# Patient Record
(demographics unavailable — no encounter records)

---

## 2025-01-22 NOTE — PLAN
[FreeTextEntry1] :  - Continue vyvanse as prescribed by psychiatry - Recommend urgent appointment with Psychiatry  - Follow up 3 months

## 2025-01-22 NOTE — DATA REVIEWED
[FreeTextEntry1] : MSLT: shows a mean sleep latency of 1.1 minutes with 4 SOREMPs episodes, consistent with Narcolepsy\par  PSG: Normal sleep architecture with SOREM\par  Mild obstructive sleep apnea (AHI 2.0/hr), worse in REM sleep ( REM AHI 5.1/hr). The rosemary desaturation dropped as low as 92%\par  Normal baseline oxygenation\par  Normal CO2\par  Normal EKG\par  Mild periodic leg movements of sleep

## 2025-01-22 NOTE — REASON FOR VISIT
[Follow-Up Evaluation] : a follow-up evaluation for [Narcolepsy] : narcolepsy [Patient] : patient [Mother] : mother

## 2025-01-22 NOTE — ASSESSMENT
[FreeTextEntry1] : 15 yo with narcolepsy here for follow up. Did not tolerate Xywav due to significant SE profile. Continues difficulties with sleep maintenance/ fragmented sleep and EDS with vyvanse, though poor sleep hygiene and concerns for significant depression.

## 2025-01-22 NOTE — HISTORY OF PRESENT ILLNESS
[FreeTextEntry1] : Afsaneh is a 15 y/o F with anxiety and depression presenting for follow up evaluation of Narcolepsy.  Recommendations at last visit: - Continue baclofen 20mg QHS with melatonin 2-3 mg - Continue vyvanse 30mg AM, 20mg in the afternoon - Follow up 3 months  Interval hx: Has not been going to school this school year. Mother says there is school refusal. Speaking with psychologist. Naps during the day, doesnt leave the house. Then she is up all night long. Mother stopped giving her baclofen. Continues to take vyvanse 20mg ? TID though she says it is not effective in promoting wakefulness. Stopped taking baclofen.  Current medications: vyvanse 20mg TID ?  Failed medication: Xyrem - negative SE profile ---------------------------------------------------------------------------------------------------- Reviewed hx: Afsaneh presents with her mother for a transfer of care for Narcolepsy previously diagnosed in [place] when patient was 9 years old. SHe was prescribed Vyvanse 20 mg daily with some relief but it did not last long enough. She has never taken another medication for Narcolepsy. Previously has a sleep study in [place]. Afsaneh sees a psychologist and was prescribed Bupropion 75 mg, escitalopram 5 mg by Dr. Angela Mcclelland for anxiety and depression. Wellbutrin was started two weeks ago because she "needed more energy" and plan to wean off the Lexapro. [facility] notes Afsaneh gained a lot of weight with Lexapro. Wellbutrin is making is "making her sleepy." Started seeing this psychiatrist September of last year. Has not been seen by neurology since Feb 2022.  Sleep Environment:  Bedtime:10 pm, falls asleep at 11 pm, Wakes up at 1 am and 4 am sleeps until 5am.  Wake time: 5 am  Weekend bed time 12 midnight  Weekend wake up time: 6 am  Sleep latency: 1 hour  Nighttime awakenings: 2 x a night occasionally  Dreams: has dreams of using the bathroom and that wakes her up.  Naps: 15 minutes- 1 hr during the week almost everyday. Weekends 3-5 hours  Snoring: She does snore  Restless: Very restless during sleep  Narcolepsy: hallucinations, sleep paralysis, cataplexy- c/o sleep paralysis when waking up and going to sleep, lasts approximately 3 minutes. Sometimes sees hallucinations. Once she was laughing and fell on the floor, happened 3 years ago. When she yawns or stretches she can feel she is going to "pass out". MOC states she has lost her flexibility.  Parasomnias:Denies sleep walking, talking, night terrors, or confusional arousal.  Family hx: No sleep apnea or sleep issues     sleep disordered breathing/ snoring code: to be covered for sleep study

## 2025-01-22 NOTE — PHYSICAL EXAM
[Well-appearing] : well-appearing [Normocephalic] : normocephalic [No dysmorphic facial features] : no dysmorphic facial features [No ocular abnormalities] : no ocular abnormalities [Neck supple] : neck supple [No abnormal neurocutaneous stigmata or skin lesions] : no abnormal neurocutaneous stigmata or skin lesions [Straight] : straight [No maxwell or dimples] : no maxwell or dimples [No deformities] : no deformities [Alert] : alert [Well related, good eye contact] : well related, good eye contact [Conversant] : conversant [Normal speech and language] : normal speech and language [Follows instructions well] : follows instructions well [VFF] : VFF [Pupils reactive to light and accommodation] : pupils reactive to light and accommodation [Full extraocular movements] : full extraocular movements [No nystagmus] : no nystagmus [Normal facial sensation to light touch] : normal facial sensation to light touch [No facial asymmetry or weakness] : no facial asymmetry or weakness [Gross hearing intact] : gross hearing intact [Equal palate elevation] : equal palate elevation [Good shoulder shrug] : good shoulder shrug [Normal tongue movement] : normal tongue movement [Midline tongue, no fasciculations] : midline tongue, no fasciculations [Normal axial and appendicular muscle tone] : normal axial and appendicular muscle tone [Gets up on table without difficulty] : gets up on table without difficulty [No pronator drift] : no pronator drift [Normal finger tapping and fine finger movements] : normal finger tapping and fine finger movements [No abnormal involuntary movements] : no abnormal involuntary movements [5/5 strength in proximal and distal muscles of arms and legs] : 5/5 strength in proximal and distal muscles of arms and legs [Walks and runs well] : walks and runs well [Able to do deep knee bend] : able to do deep knee bend [Able to walk on heels] : able to walk on heels [Able to walk on toes] : able to walk on toes [Localizes LT and temperature] : localizes LT and temperature [No dysmetria on FTNT] : no dysmetria on FTNT [Good walking balance] : good walking balance [Normal gait] : normal gait